# Patient Record
Sex: FEMALE | Race: WHITE | NOT HISPANIC OR LATINO | Employment: STUDENT | URBAN - METROPOLITAN AREA
[De-identification: names, ages, dates, MRNs, and addresses within clinical notes are randomized per-mention and may not be internally consistent; named-entity substitution may affect disease eponyms.]

---
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
## 2020-09-03 ENCOUNTER — LAB VISIT (OUTPATIENT)
Dept: LAB | Facility: OTHER | Age: 20
End: 2020-09-03
Payer: COMMERCIAL

## 2020-09-03 DIAGNOSIS — K50.80 REGIONAL ENTERITIS OF SMALL INTESTINE WITH LARGE INTESTINE: Primary | ICD-10-CM

## 2020-09-03 DIAGNOSIS — Z01.83 ENCOUNTER FOR BLOOD TYPING: ICD-10-CM

## 2020-09-03 DIAGNOSIS — R68.89 MECHANICAL AND MOTOR PROBLEMS WITH INTERNAL ORGANS: ICD-10-CM

## 2020-09-03 DIAGNOSIS — R20.0 TACTILE ANESTHESIA: ICD-10-CM

## 2020-09-03 LAB
ABO + RH BLD: NORMAL
ALBUMIN SERPL BCP-MCNC: 4.4 G/DL (ref 3.5–5.2)
ALP SERPL-CCNC: 44 U/L (ref 55–135)
ALT SERPL W/O P-5'-P-CCNC: 16 U/L (ref 10–44)
ANION GAP SERPL CALC-SCNC: 12 MMOL/L (ref 8–16)
AST SERPL-CCNC: 21 U/L (ref 10–40)
BILIRUB SERPL-MCNC: 0.5 MG/DL (ref 0.1–1)
BLD GP AB SCN CELLS X3 SERPL QL: NORMAL
BUN SERPL-MCNC: 10 MG/DL (ref 6–20)
CALCIUM SERPL-MCNC: 9.6 MG/DL (ref 8.7–10.5)
CHLORIDE SERPL-SCNC: 105 MMOL/L (ref 95–110)
CHOLEST SERPL-MCNC: 155 MG/DL (ref 120–199)
CHOLEST/HDLC SERPL: 4.2 {RATIO} (ref 2–5)
CO2 SERPL-SCNC: 23 MMOL/L (ref 23–29)
CREAT SERPL-MCNC: 0.9 MG/DL (ref 0.5–1.4)
CRP SERPL-MCNC: 0.2 MG/L (ref 0–8.2)
EST. GFR  (AFRICAN AMERICAN): >60 ML/MIN/1.73 M^2
EST. GFR  (NON AFRICAN AMERICAN): >60 ML/MIN/1.73 M^2
FERRITIN SERPL-MCNC: 98 NG/ML (ref 20–300)
FOLATE SERPL-MCNC: 39.5 NG/ML (ref 4–24)
GLUCOSE SERPL-MCNC: 88 MG/DL (ref 70–110)
HDLC SERPL-MCNC: 37 MG/DL (ref 40–75)
HDLC SERPL: 23.9 % (ref 20–50)
LDLC SERPL CALC-MCNC: 105 MG/DL (ref 63–159)
NONHDLC SERPL-MCNC: 118 MG/DL
POTASSIUM SERPL-SCNC: 3.9 MMOL/L (ref 3.5–5.1)
PROT SERPL-MCNC: 7.7 G/DL (ref 6–8.4)
SODIUM SERPL-SCNC: 140 MMOL/L (ref 136–145)
TRIGL SERPL-MCNC: 65 MG/DL (ref 30–150)
VIT B12 SERPL-MCNC: 337 PG/ML (ref 210–950)

## 2020-09-03 PROCEDURE — 86901 BLOOD TYPING SEROLOGIC RH(D): CPT

## 2020-09-03 PROCEDURE — 83921 ORGANIC ACID SINGLE QUANT: CPT

## 2020-09-03 PROCEDURE — 80053 COMPREHEN METABOLIC PANEL: CPT

## 2020-09-03 PROCEDURE — 85651 RBC SED RATE NONAUTOMATED: CPT

## 2020-09-03 PROCEDURE — 86140 C-REACTIVE PROTEIN: CPT

## 2020-09-03 PROCEDURE — 36415 COLL VENOUS BLD VENIPUNCTURE: CPT

## 2020-09-03 PROCEDURE — 85025 COMPLETE CBC W/AUTO DIFF WBC: CPT

## 2020-09-03 PROCEDURE — 82728 ASSAY OF FERRITIN: CPT

## 2020-09-03 PROCEDURE — 82746 ASSAY OF FOLIC ACID SERUM: CPT

## 2020-09-03 PROCEDURE — 80061 LIPID PANEL: CPT

## 2020-09-03 PROCEDURE — 83540 ASSAY OF IRON: CPT

## 2020-09-03 PROCEDURE — 82607 VITAMIN B-12: CPT

## 2020-09-10 LAB — METHYLMALONATE SERPL-SCNC: 0.2 UMOL/L

## 2020-09-15 ENCOUNTER — OFFICE VISIT (OUTPATIENT)
Dept: INTERNAL MEDICINE | Facility: CLINIC | Age: 20
End: 2020-09-15
Payer: COMMERCIAL

## 2020-09-15 ENCOUNTER — IMMUNIZATION (OUTPATIENT)
Dept: INTERNAL MEDICINE | Facility: CLINIC | Age: 20
End: 2020-09-15
Payer: COMMERCIAL

## 2020-09-15 VITALS
TEMPERATURE: 99 F | WEIGHT: 127 LBS | HEART RATE: 84 BPM | DIASTOLIC BLOOD PRESSURE: 80 MMHG | BODY MASS INDEX: 21.16 KG/M2 | OXYGEN SATURATION: 100 % | HEIGHT: 65 IN | SYSTOLIC BLOOD PRESSURE: 106 MMHG

## 2020-09-15 DIAGNOSIS — R59.9 ADENOPATHY: Primary | ICD-10-CM

## 2020-09-15 DIAGNOSIS — Z23 NEED FOR INFLUENZA VACCINATION: ICD-10-CM

## 2020-09-15 PROCEDURE — 99214 PR OFFICE/OUTPT VISIT, EST, LEVL IV, 30-39 MIN: ICD-10-PCS | Mod: S$GLB,,, | Performed by: FAMILY MEDICINE

## 2020-09-15 PROCEDURE — 90471 FLU VACCINE (QUAD) GREATER THAN OR EQUAL TO 3YO PRESERVATIVE FREE IM: ICD-10-PCS | Mod: S$GLB,,, | Performed by: INTERNAL MEDICINE

## 2020-09-15 PROCEDURE — 99999 PR PBB SHADOW E&M-EST. PATIENT-LVL I: CPT | Mod: PBBFAC,,,

## 2020-09-15 PROCEDURE — 99999 PR PBB SHADOW E&M-EST. PATIENT-LVL I: ICD-10-PCS | Mod: PBBFAC,,,

## 2020-09-15 PROCEDURE — 99214 OFFICE O/P EST MOD 30 MIN: CPT | Mod: S$GLB,,, | Performed by: FAMILY MEDICINE

## 2020-09-15 PROCEDURE — 90686 IIV4 VACC NO PRSV 0.5 ML IM: CPT | Mod: S$GLB,,, | Performed by: INTERNAL MEDICINE

## 2020-09-15 PROCEDURE — 3008F PR BODY MASS INDEX (BMI) DOCUMENTED: ICD-10-PCS | Mod: CPTII,S$GLB,, | Performed by: FAMILY MEDICINE

## 2020-09-15 PROCEDURE — 3008F BODY MASS INDEX DOCD: CPT | Mod: CPTII,S$GLB,, | Performed by: FAMILY MEDICINE

## 2020-09-15 PROCEDURE — 90686 FLU VACCINE (QUAD) GREATER THAN OR EQUAL TO 3YO PRESERVATIVE FREE IM: ICD-10-PCS | Mod: S$GLB,,, | Performed by: INTERNAL MEDICINE

## 2020-09-15 PROCEDURE — 90471 IMMUNIZATION ADMIN: CPT | Mod: S$GLB,,, | Performed by: INTERNAL MEDICINE

## 2020-09-15 PROCEDURE — 99999 PR PBB SHADOW E&M-EST. PATIENT-LVL V: ICD-10-PCS | Mod: PBBFAC,,, | Performed by: FAMILY MEDICINE

## 2020-09-15 PROCEDURE — 99999 PR PBB SHADOW E&M-EST. PATIENT-LVL V: CPT | Mod: PBBFAC,,, | Performed by: FAMILY MEDICINE

## 2020-09-15 RX ORDER — MESALAMINE 500 MG/1
1500 CAPSULE, EXTENDED RELEASE ORAL 2 TIMES DAILY
COMMUNITY

## 2020-09-15 NOTE — PATIENT INSTRUCTIONS
Local Lymph Node Swelling in the Neck, No Antibiotic Treatment    You have a swollen lymph node in your neck that is not infected. The lymph nodes are part of the immune system. They are found under the jaw and along the side of the neck, in the armpits, and in the groin. A nearby infection or inflammation causes the lymph nodes in that area to swell. They may also be mildly tender. This is normal.  Antibiotics are not used for a swollen lymph node that is not infected. You can use hot compresses and pain medicine to treat this condition. The pain will get better over the next 7 to 10 days. The swelling may take several months to go away.  Rarely, a bacterial infection occurs inside the lymph node, itself. When this happens, the lymph node becomes very painful and the nearby skin gets red and warm. You may also have a fever. If this happens, call your healthcare provider. You may need to take antibiotics. You may also need to have the lymph node drained.  Home care  Follow these guidelines when caring for yourself at home:  · Make a hot compress by running warm water over a wash cloth. Put the compress on the sore area until the compress cools off. Repeat this for 20 minutes. Use the compress 3 times a day for the first 3 days, or until the pain and redness begin to get better. The heat will make more blood flow to the area and speed the healing process.  · You may use acetaminophen or ibuprofen to control pain and fever, unless another medicine was prescribed for this. Dont use ibuprofen in children under 6 months of age. If you have chronic liver or kidney disease, talk with your healthcare provider before using these medicines. Also talk with your provider if youve had a stomach ulcer or GI bleeding. Dont give aspirin to anyone under 18 years of age who is ill with a fever. It may cause severe liver damage.  Follow-up care  Follow up with your healthcare provider, or as advised.  When to seek medical  advice  Call your healthcare provider right away if any of these occur:  · Redness over the lymph node  · Swelling or pain in the lymph node gets worse  · Lymph node is getting soft in the middle  · Pus or fluid drains from the lymph node  · You have trouble breathing or swallowing  · Fever of 100.4°F (38°C) or higher, or as directed by your healthcare provider  · You have questions or concerns   Date Last Reviewed: 2/1/2017  © 9303-8550 [employer]. [address] [place] [address]. All rights reserved. This information is not intended as a substitute for professional medical care. Always follow your healthcare professional's instructions.

## 2020-09-17 LAB
BASOPHILS # BLD AUTO: 0.08 K/UL (ref 0–0.2)
BASOPHILS NFR BLD: 1 % (ref 0–1.9)
DIFFERENTIAL METHOD: NORMAL
EOSINOPHIL # BLD AUTO: 0.2 K/UL (ref 0–0.5)
EOSINOPHIL NFR BLD: 2.3 % (ref 0–8)
ERYTHROCYTE [DISTWIDTH] IN BLOOD BY AUTOMATED COUNT: 11.5 % (ref 11.5–14.5)
ERYTHROCYTE [SEDIMENTATION RATE] IN BLOOD: 2 MM/HR (ref 0–20)
HCT VFR BLD AUTO: 45.2 % (ref 37–48.5)
HGB BLD-MCNC: 15.2 G/DL (ref 12–16)
IMM GRANULOCYTES # BLD AUTO: 0.01 K/UL (ref 0–0.04)
IMM GRANULOCYTES NFR BLD AUTO: 0.1 % (ref 0–0.5)
IRON SERPL-MCNC: 127 UG/DL (ref 30–160)
LYMPHOCYTES # BLD AUTO: 3.6 K/UL (ref 1–4.8)
LYMPHOCYTES NFR BLD: 44.8 % (ref 18–48)
MCH RBC QN AUTO: 31 PG (ref 27–31)
MCHC RBC AUTO-ENTMCNC: 33.6 G/DL (ref 32–36)
MCV RBC AUTO: 92 FL (ref 82–98)
MONOCYTES # BLD AUTO: 0.7 K/UL (ref 0.3–1)
MONOCYTES NFR BLD: 9.2 % (ref 4–15)
NEUTROPHILS # BLD AUTO: 3.4 K/UL (ref 1.8–7.7)
NEUTROPHILS NFR BLD: 42.6 % (ref 38–73)
NRBC BLD-RTO: 0 /100 WBC
PLATELET # BLD AUTO: 178 K/UL (ref 150–350)
PMV BLD AUTO: 11 FL (ref 9.2–12.9)
RBC # BLD AUTO: 4.91 M/UL (ref 4–5.4)
SATURATED IRON: 30 % (ref 20–50)
TOTAL IRON BINDING CAPACITY: 425 UG/DL (ref 250–450)
TRANSFERRIN SERPL-MCNC: 287 MG/DL (ref 200–375)
WBC # BLD AUTO: 7.93 K/UL (ref 3.9–12.7)

## 2020-09-18 ENCOUNTER — OFFICE VISIT (OUTPATIENT)
Dept: OTOLARYNGOLOGY | Facility: CLINIC | Age: 20
End: 2020-09-18
Payer: COMMERCIAL

## 2020-09-18 VITALS
BODY MASS INDEX: 20.95 KG/M2 | DIASTOLIC BLOOD PRESSURE: 73 MMHG | WEIGHT: 125.88 LBS | SYSTOLIC BLOOD PRESSURE: 121 MMHG | HEART RATE: 64 BPM

## 2020-09-18 DIAGNOSIS — R59.9 ADENOPATHY: Primary | ICD-10-CM

## 2020-09-18 LAB
FLOW CYTOMETRY ANTIBODIES ANALYZED - LYMPH NODE: NORMAL
FLOW CYTOMETRY COMMENT - LYMPH NODE: NORMAL
FLOW CYTOMETRY INTERPRETATION - LYMPH NODE: NORMAL

## 2020-09-18 PROCEDURE — 99243 OFF/OP CNSLTJ NEW/EST LOW 30: CPT | Mod: ,,, | Performed by: OTOLARYNGOLOGY

## 2020-09-18 PROCEDURE — 88173 PR  INTERPRETATION OF FNA SMEAR: ICD-10-PCS | Mod: 26,,, | Performed by: PATHOLOGY

## 2020-09-18 PROCEDURE — 88173 CYTOPATH EVAL FNA REPORT: CPT | Performed by: PATHOLOGY

## 2020-09-18 PROCEDURE — 88189 PR  FLOWCYTOMETRY/READ, 16 & > MARKERS: ICD-10-PCS | Mod: ,,, | Performed by: PATHOLOGY

## 2020-09-18 PROCEDURE — 88173 CYTOPATH EVAL FNA REPORT: CPT | Mod: 26,,, | Performed by: PATHOLOGY

## 2020-09-18 PROCEDURE — 99999 PR PBB SHADOW E&M-EST. PATIENT-LVL III: ICD-10-PCS | Mod: PBBFAC,,, | Performed by: OTOLARYNGOLOGY

## 2020-09-18 PROCEDURE — 99999 PR PBB SHADOW E&M-EST. PATIENT-LVL III: CPT | Mod: PBBFAC,,, | Performed by: OTOLARYNGOLOGY

## 2020-09-18 PROCEDURE — 88189 FLOWCYTOMETRY/READ 16 & >: CPT | Mod: ,,, | Performed by: PATHOLOGY

## 2020-09-18 PROCEDURE — 88185 FLOWCYTOMETRY/TC ADD-ON: CPT | Performed by: PATHOLOGY

## 2020-09-18 PROCEDURE — 88184 FLOWCYTOMETRY/ TC 1 MARKER: CPT | Performed by: PATHOLOGY

## 2020-09-18 PROCEDURE — 99243 PR OFFICE CONSULTATION,LEVEL III: ICD-10-PCS | Mod: ,,, | Performed by: OTOLARYNGOLOGY

## 2020-09-18 NOTE — LETTER
September 18, 2020      Ronald Harper MD  [address]  [place] [address]           [id number]  [address]  [facility] [address]  Phone: [phone]  Fax: [phone]          Patient: Narcisa Holbrook   MR Number: [id number]   YOB: 2000   Date of Visit: 9/18/2020       Dear Dr. Ronald Harper:    Thank you for referring Narcisa Holbrook to me for evaluation. Attached you will find relevant portions of my assessment and plan of care.    If you have questions, please do not hesitate to call me. I look forward to following Narcisa Holbrook along with you.    Sincerely,    Donald Goldberg MD    Enclosure  CC:  No Recipients    If you would like to receive this communication electronically, please contact [email or web address] or [phone] to request more information on [employer] access.    For providers and/or their staff who would like to refer a patient to [facility], please contact us through our one-stop-shop provider referral line, [facility], at [phone].    If you feel you have received this communication in error or would no longer like to receive these types of communications, please e-mail [email or web address]

## 2020-09-18 NOTE — PROGRESS NOTES
Head and Neck Surgery Consult    Seen in consultation from Dr Harper    HPI: Narcisa Holbrook is a 20 y.o. female presenting with 2 months of an incidentally noted, asymptomatic, 0.5cm rubbery mobile R level II neck node. There is also a R level V node of similar appearance. She has been on humira since 2012 with no changes in dosing or other problems. She denies systemic symptoms, fevers/chills/night sweats, and denies any other adenopathy. She is a lifelong nonsmoker.    History reviewed. No pertinent past medical history.    History reviewed. No pertinent surgical history.      Current Outpatient Medications:     adalimumab (HUMIRA,CF, PEN SUBQ), Inject into the skin., Disp: , Rfl:     mesalamine (PENTASA) 500 MG CR capsule, Take 1,500 mg by mouth 2 (two) times a day., Disp: , Rfl:     Review of patient's allergies indicates:  No Known Allergies    History reviewed. No pertinent family history.    Social History     Socioeconomic History    Marital status: Single     Spouse name: Not on file    Number of children: Not on file    Years of education: Not on file    Highest education level: Not on file   Occupational History    Not on file   Social Needs    Financial resource strain: Not on file    Food insecurity     Worry: Not on file     Inability: Not on file    Transportation needs     Medical: Not on file     Non-medical: Not on file   Tobacco Use    Smoking status: Never Smoker    Smokeless tobacco: Never Used   Substance and Sexual Activity    Alcohol use: Yes     Alcohol/week: 5.0 standard drinks     Types: 2 Glasses of wine, 2 Cans of beer, 1 Shots of liquor per week     Frequency: 2-3 times a week     Drinks per session: 1 or 2    Drug use: Never    Sexual activity: Not on file   Lifestyle    Physical activity     Days per week: Not on file     Minutes per session: Not on file    Stress: Not on file   Relationships    Social connections     Talks on phone: Not on file     Gets together: Not on  file     Attends Manda[employer] service: Not on file     Active member of club or organization: Not on file     Attends meetings of clubs or organizations: Not on file     Relationship status: Not on file   Other Topics Concern    Not on file   Social History Narrative    Not on file       Review of Systems -  Constitutional: Denies having night sweats, constant fatigue, loss of appetite or recent substantial weight loss.  Eyes: Denies blurred vision or double vision.  Respiratory: Denies symptoms of shortness of breath, noisy breathing, hoarseness or chronic cough.  GI: Denies symptoms of heartburn, acid regurgitation, or the known presence of a hiatal hernia.  The remainder of a 10-point review of systems is negative    REVIEW OF RADIOLOGICAL FILMS AND RECORDS (PERSONALLY REVIEWED):  noncontributory    REVIEW OF LABS (PERSONALLY REVIEWED)  noncontributory    PHYSICAL EXAM:  Vitals - There were no vitals taken for this visit.  Constitutional -      General Appearance: well developed, well nourished, without obvious deformities     Communication: speaks with a normal voice without hoarseness  Head & Face -     Overall: no obvious scars, lesions or masses     Parotid and submandibular glands: no masses or tenderness     Facial strength: normal and equal bilaterally  Eyes -      EOM intact  Ear, Nose, Mouth & Throat -     Ears: both left and right external auditory canals and TM's are normal, no external deformities     Nasal exam: mucosa is pink, septum is midline, visible turbinates are normal on anterior rhinoscopy     Mastication: teeth appear in good repair     Oral Cavity and oropharynx: mucosa, hard and soft palates, tongue, posterior pharyngeal wall, lips and gums are without lesions. Tonsils appear 1+ cryptic  Respiratory:     Breathing unlabored, no stridor  Cardiovascular:     No JVD, capillary refill normal  Larynx: using the mirror for indirect laryngoscopy, the epiglottic, false cords, true cords, and  pyriform sinuses are without lesions and the true vocal cords move normally  Neck: appears symmetric, and on palpation is without masses   Endocrine:     Thyroid: no asymmetry, thyromegaly, or thyroid nodules on palpation  Lymphatic:     No cervical lymphadenopathy except for R level IIa 5mm mobile rubbery node, similar node in R level V  Cranial Nerves:      II: Pupillary reflexes normal     III, IV, VI: EOM normal     V: 1,2,3: normal sensation     VII: Normal strength in all divisions     IX, X: Normal voice, palatal elevation and sensation     XI: Shoulder strength normal       XII: Tongue mobility normal  Psychiatric:     Appropriate affect    ASSESSMENT: cervical lymphadenopathy    PLAN: This is likely a benign process but given her history of Humira I will FNA in clinic today to R/O lymphoma. I will call her with the results.       Donald Goldberg

## 2020-09-21 LAB — FINAL PATHOLOGIC DIAGNOSIS: NORMAL

## 2020-09-22 ENCOUNTER — TELEPHONE (OUTPATIENT)
Dept: OTOLARYNGOLOGY | Facility: CLINIC | Age: 20
End: 2020-09-22

## 2021-02-23 ENCOUNTER — OFFICE VISIT (OUTPATIENT)
Dept: INTERNAL MEDICINE | Facility: CLINIC | Age: 21
End: 2021-02-23
Payer: COMMERCIAL

## 2021-02-23 VITALS
RESPIRATION RATE: 16 BRPM | TEMPERATURE: 99 F | DIASTOLIC BLOOD PRESSURE: 60 MMHG | WEIGHT: 121.25 LBS | OXYGEN SATURATION: 98 % | BODY MASS INDEX: 20.2 KG/M2 | SYSTOLIC BLOOD PRESSURE: 108 MMHG | HEIGHT: 65 IN | HEART RATE: 56 BPM

## 2021-02-23 DIAGNOSIS — K50.919 CROHN'S DISEASE WITH COMPLICATION, UNSPECIFIED GASTROINTESTINAL TRACT LOCATION: ICD-10-CM

## 2021-02-23 DIAGNOSIS — G57.22 ANTERIOR FEMORAL CUTANEOUS NEUROPATHY OF LEFT LOWER EXTREMITY: Primary | ICD-10-CM

## 2021-02-23 PROCEDURE — 1126F PR PAIN SEVERITY QUANTIFIED, NO PAIN PRESENT: ICD-10-PCS | Mod: S$GLB,,, | Performed by: HOSPITALIST

## 2021-02-23 PROCEDURE — 99999 PR PBB SHADOW E&M-EST. PATIENT-LVL III: ICD-10-PCS | Mod: PBBFAC,,, | Performed by: HOSPITALIST

## 2021-02-23 PROCEDURE — 99999 PR PBB SHADOW E&M-EST. PATIENT-LVL III: CPT | Mod: PBBFAC,,, | Performed by: HOSPITALIST

## 2021-02-23 PROCEDURE — 99203 PR OFFICE/OUTPT VISIT, NEW, LEVL III, 30-44 MIN: ICD-10-PCS | Mod: S$GLB,,, | Performed by: HOSPITALIST

## 2021-02-23 PROCEDURE — 3008F PR BODY MASS INDEX (BMI) DOCUMENTED: ICD-10-PCS | Mod: CPTII,S$GLB,, | Performed by: HOSPITALIST

## 2021-02-23 PROCEDURE — 1126F AMNT PAIN NOTED NONE PRSNT: CPT | Mod: S$GLB,,, | Performed by: HOSPITALIST

## 2021-02-23 PROCEDURE — 99203 OFFICE O/P NEW LOW 30 MIN: CPT | Mod: S$GLB,,, | Performed by: HOSPITALIST

## 2021-02-23 PROCEDURE — 3008F BODY MASS INDEX DOCD: CPT | Mod: CPTII,S$GLB,, | Performed by: HOSPITALIST
